# Patient Record
Sex: FEMALE | Race: WHITE | NOT HISPANIC OR LATINO | Employment: STUDENT | URBAN - METROPOLITAN AREA
[De-identification: names, ages, dates, MRNs, and addresses within clinical notes are randomized per-mention and may not be internally consistent; named-entity substitution may affect disease eponyms.]

---
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
## 2024-02-19 ENCOUNTER — OFFICE VISIT (OUTPATIENT)
Dept: PRIMARY CARE | Facility: CLINIC | Age: 19
End: 2024-02-19
Payer: COMMERCIAL

## 2024-02-19 VITALS
WEIGHT: 132 LBS | HEART RATE: 75 BPM | TEMPERATURE: 98.5 F | RESPIRATION RATE: 18 BRPM | HEIGHT: 61 IN | DIASTOLIC BLOOD PRESSURE: 74 MMHG | OXYGEN SATURATION: 98 % | SYSTOLIC BLOOD PRESSURE: 121 MMHG | BODY MASS INDEX: 24.92 KG/M2

## 2024-02-19 DIAGNOSIS — J45.20 MILD INTERMITTENT REACTIVE AIRWAY DISEASE WITHOUT COMPLICATION (HHS-HCC): ICD-10-CM

## 2024-02-19 DIAGNOSIS — F41.9 ANXIETY: Primary | ICD-10-CM

## 2024-02-19 DIAGNOSIS — F45.22 BODY DYSMORPHIC DISORDER: ICD-10-CM

## 2024-02-19 DIAGNOSIS — F33.41 RECURRENT MAJOR DEPRESSIVE DISORDER, IN PARTIAL REMISSION (CMS-HCC): ICD-10-CM

## 2024-02-19 PROBLEM — M79.602 PARESTHESIA AND PAIN OF BOTH UPPER EXTREMITIES: Status: RESOLVED | Noted: 2023-03-24 | Resolved: 2024-02-19

## 2024-02-19 PROBLEM — S39.012A LOW BACK STRAIN: Status: RESOLVED | Noted: 2023-03-21 | Resolved: 2024-02-19

## 2024-02-19 PROBLEM — R20.2 PARESTHESIA AND PAIN OF BOTH UPPER EXTREMITIES: Status: RESOLVED | Noted: 2023-03-24 | Resolved: 2024-02-19

## 2024-02-19 PROBLEM — M79.601 PARESTHESIA AND PAIN OF BOTH UPPER EXTREMITIES: Status: RESOLVED | Noted: 2023-03-24 | Resolved: 2024-02-19

## 2024-02-19 PROBLEM — F32.A ANXIETY AND DEPRESSION: Status: RESOLVED | Noted: 2023-03-21 | Resolved: 2024-02-19

## 2024-02-19 PROBLEM — B07.0 PLANTAR WART OF RIGHT FOOT: Status: RESOLVED | Noted: 2023-03-21 | Resolved: 2024-02-19

## 2024-02-19 PROBLEM — R42 EPISODIC LIGHTHEADEDNESS: Status: RESOLVED | Noted: 2023-03-24 | Resolved: 2024-02-19

## 2024-02-19 PROBLEM — R20.0 NUMBNESS IN BOTH HANDS: Status: RESOLVED | Noted: 2023-03-21 | Resolved: 2024-02-19

## 2024-02-19 PROBLEM — J45.909 REACTIVE AIRWAY DISEASE WITHOUT COMPLICATION (HHS-HCC): Status: ACTIVE | Noted: 2024-02-19

## 2024-02-19 PROCEDURE — 99213 OFFICE O/P EST LOW 20 MIN: CPT

## 2024-02-19 RX ORDER — ALBUTEROL SULFATE 90 UG/1
2 AEROSOL, METERED RESPIRATORY (INHALATION) EVERY 4 HOURS PRN
Qty: 18 G | Refills: 2 | Status: SHIPPED | OUTPATIENT
Start: 2024-02-19 | End: 2024-03-20

## 2024-02-19 RX ORDER — HYDROXYZINE HYDROCHLORIDE 25 MG/1
25 TABLET, FILM COATED ORAL NIGHTLY PRN
Qty: 90 TABLET | Refills: 3 | Status: SHIPPED | OUTPATIENT
Start: 2024-02-19

## 2024-02-19 RX ORDER — SERTRALINE HYDROCHLORIDE 50 MG/1
50 TABLET, FILM COATED ORAL DAILY
Qty: 90 TABLET | Refills: 3 | Status: SHIPPED | OUTPATIENT
Start: 2024-02-19

## 2024-02-19 NOTE — ASSESSMENT & PLAN NOTE
We will restart Zoloft 50 mg daily at patient's request.  He would also like to continue hydroxyzine 25 mg nightly.  Continue seeing talk therapist.  Continue seeing endocrinologist for testosterone.  ED precautions given.

## 2024-02-19 NOTE — ASSESSMENT & PLAN NOTE
Patient occasionally gets short of breath with physical activity.  Denies chest pain, palpitations.  It does resolve quickly.  Heart with regular rate and rhythm without any murmurs, and lungs clear to auscultation bilaterally without any increased work of breathing on exam.  He would like to try an albuterol rescue inhaler.  Will provide patient with this.  Follow-up if not improving symptoms.

## 2024-02-19 NOTE — PROGRESS NOTES
"Troy Paul \"David\" is a 18 y.o. adult who presents for Anxiety (Follow up on anxiety/depression. /Refills needed on zoloft.).  Patient ran out of his Zoloft a month or 2 ago.  He wanted to see how he would feel off of it.  However his anxiety and depression have gotten significantly worse since being off the medication.  Of note he is a transgender male, assigned female at birth.  He has been on testosterone and following with a endocrinologist for a little over a year now.  He is also following with a talk therapist.  In addition to the daily Zoloft, he takes hydroxyzine 25 mg every night to help him sleep.  He stopped smoking cigarettes in November 2023.  He denies any vaping, drug use, alcohol use.  He has a history of anorexia however he has not had any issues with disordered eating recently.  He endorses passive thoughts of self-harm however denies any actual self-harm or any plan.  He is also interested in getting top surgery so his therapist did write him a letter for this.    Objective   /74 (BP Location: Right arm, Patient Position: Sitting)   Pulse 75   Temp 36.9 °C (98.5 °F)   Resp 18   Ht 1.549 m (5' 1\")   Wt 59.9 kg (132 lb)   SpO2 98%   BMI 24.94 kg/m²    PHYSICAL EXAM  Gen: Well appearing, in NAD  Eyes: EOMI  Heart: RRR, no murmurs  Lungs: No increased work of breathing, CTAB, on RA  Extremities: WWP, cap refill <2sec, no pitting edema in LE b/l  Neuro: Alert, symmetrical facies, moves all extremities equally  Psych: Appropriate mood and affect    Assessment/Plan     Problem List Items Addressed This Visit       Body dysmorphic disorder    Anxiety - Primary     We will restart Zoloft 50 mg daily at patient's request.  He would also like to continue hydroxyzine 25 mg nightly.  Continue seeing talk therapist.  Continue seeing endocrinologist for testosterone.  ED precautions given.         Relevant Medications    sertraline (Zoloft) 50 mg tablet    hydrOXYzine HCL (Atarax) " 25 mg tablet    Recurrent major depressive disorder, in partial remission (CMS/[facility])    Reactive airway disease without complication     Patient occasionally gets short of breath with physical activity.  Denies chest pain, palpitations.  It does resolve quickly.  Heart with regular rate and rhythm without any murmurs, and lungs clear to auscultation bilaterally without any increased work of breathing on exam.  He would like to try an albuterol rescue inhaler.  Will provide patient with this.  Follow-up if not improving symptoms.         Relevant Medications    albuterol (ProAir HFA) 90 mcg/actuation inhaler      Lesli Dunn DO  Family Medicine Resident, PGY-3  [facility]  [address] [place] [address] [phone]

## 2024-02-20 NOTE — PROGRESS NOTES
I reviewed the resident/fellow's documentation and discussed the patient with the resident. I agree with the resident medical decision making as documented in the note.   Patient has not been on their SSRI for some time.   does have increased anxiety, no SI or HI thoughts, no harmful thoughts himself or others.   would like to restarted we will do that.  Side effects of medication were explained to the patient by the resident  If Patient starts experiencing any SI or HI thoughts any worsening anxiety or depression any harmful thoughts to self or others patient is to go to the ER  Continue to see therapy  Follow-up in 2 to 3 weeks  Agree with assessment and plan  Anthony Foreman, DO

## 2024-08-29 ENCOUNTER — TELEPHONE (OUTPATIENT)
Dept: PRIMARY CARE | Facility: CLINIC | Age: 19
End: 2024-08-29
Payer: COMMERCIAL

## 2024-08-29 NOTE — TELEPHONE ENCOUNTER
Patient called and requested lab orders for titer testing. Did you want to see patient first before placing orders?

## 2024-09-04 ENCOUNTER — TELEPHONE (OUTPATIENT)
Dept: PRIMARY CARE | Facility: CLINIC | Age: 19
End: 2024-09-04
Payer: COMMERCIAL

## 2024-09-04 NOTE — TELEPHONE ENCOUNTER
Patient called back in regards to lab orders he needs he needs lab  orders to test for     HEP B   Varicella   TDAP  TB     Please place orders I did advise the patient I would call him back once they are placed.

## 2024-09-05 DIAGNOSIS — Z71.85 ENCOUNTER FOR COUNSELING REGARDING IMMUNIZATION: Primary | ICD-10-CM

## 2024-09-10 ENCOUNTER — LAB (OUTPATIENT)
Dept: LAB | Facility: LAB | Age: 19
End: 2024-09-10
Payer: COMMERCIAL

## 2024-09-10 DIAGNOSIS — Z71.85 ENCOUNTER FOR COUNSELING REGARDING IMMUNIZATION: ICD-10-CM

## 2024-09-10 PROCEDURE — 86481 TB AG RESPONSE T-CELL SUSP: CPT

## 2024-09-10 PROCEDURE — 86787 VARICELLA-ZOSTER ANTIBODY: CPT

## 2024-09-10 PROCEDURE — 86706 HEP B SURFACE ANTIBODY: CPT

## 2024-09-10 PROCEDURE — 36415 COLL VENOUS BLD VENIPUNCTURE: CPT

## 2024-09-11 LAB
HBV SURFACE AB SER-ACNC: <3.1 MIU/ML
VARICELLA ZOSTER IGG INDEX: 3.8 IA
VZV IGG SER QL IA: POSITIVE

## 2024-09-12 LAB
NIL(NEG) CONTROL SPOT COUNT: NORMAL
PANEL A SPOT COUNT: 0
PANEL B SPOT COUNT: 1
POS CONTROL SPOT COUNT: NORMAL
T-SPOT. TB INTERPRETATION: NEGATIVE

## 2024-09-16 ENCOUNTER — APPOINTMENT (OUTPATIENT)
Dept: PRIMARY CARE | Facility: CLINIC | Age: 19
End: 2024-09-16
Payer: COMMERCIAL

## 2024-09-18 ENCOUNTER — TELEPHONE (OUTPATIENT)
Dept: OPHTHALMOLOGY | Facility: CLINIC | Age: 19
End: 2024-09-18

## 2024-09-24 ENCOUNTER — TELEPHONE (OUTPATIENT)
Dept: PRIMARY CARE | Facility: CLINIC | Age: 19
End: 2024-09-24
Payer: COMMERCIAL

## 2024-09-24 DIAGNOSIS — Z71.85 ENCOUNTER FOR COUNSELING REGARDING IMMUNIZATION: Primary | ICD-10-CM

## 2024-10-03 ENCOUNTER — OFFICE VISIT (OUTPATIENT)
Dept: PRIMARY CARE | Facility: CLINIC | Age: 19
End: 2024-10-03
Payer: COMMERCIAL

## 2024-10-03 VITALS
OXYGEN SATURATION: 98 % | HEIGHT: 61 IN | TEMPERATURE: 96 F | RESPIRATION RATE: 19 BRPM | HEART RATE: 86 BPM | DIASTOLIC BLOOD PRESSURE: 64 MMHG | SYSTOLIC BLOOD PRESSURE: 120 MMHG | WEIGHT: 135.4 LBS | BODY MASS INDEX: 25.57 KG/M2

## 2024-10-03 DIAGNOSIS — Z86.59 HISTORY OF BULIMIA: ICD-10-CM

## 2024-10-03 DIAGNOSIS — F60.3 BORDERLINE PERSONALITY DISORDER (MULTI): ICD-10-CM

## 2024-10-03 DIAGNOSIS — H52.13 BILATERAL MYOPIA: ICD-10-CM

## 2024-10-03 DIAGNOSIS — H52.223 REGULAR ASTIGMATISM OF BOTH EYES: ICD-10-CM

## 2024-10-03 DIAGNOSIS — F41.9 ANXIETY: ICD-10-CM

## 2024-10-03 DIAGNOSIS — Z86.59 HISTORY OF ANOREXIA NERVOSA: ICD-10-CM

## 2024-10-03 DIAGNOSIS — F45.22 BODY DYSMORPHIC DISORDER: ICD-10-CM

## 2024-10-03 DIAGNOSIS — Z91.89 HISTORY OF DRUG OVERDOSE: ICD-10-CM

## 2024-10-03 DIAGNOSIS — Z00.00 ANNUAL PHYSICAL EXAM: Primary | ICD-10-CM

## 2024-10-03 DIAGNOSIS — Z78.9 FEMALE-TO-MALE TRANSGENDER PERSON: ICD-10-CM

## 2024-10-03 DIAGNOSIS — F33.41 RECURRENT MAJOR DEPRESSIVE DISORDER, IN PARTIAL REMISSION (CMS-HCC): ICD-10-CM

## 2024-10-03 PROBLEM — J45.909 REACTIVE AIRWAY DISEASE WITHOUT COMPLICATION (HHS-HCC): Status: RESOLVED | Noted: 2024-02-19 | Resolved: 2024-10-03

## 2024-10-03 PROBLEM — T50.902A INTENTIONAL OVERDOSE OF DRUG IN TABLET FORM (MULTI): Status: ACTIVE | Noted: 2024-10-03

## 2024-10-03 PROCEDURE — 99395 PREV VISIT EST AGE 18-39: CPT

## 2024-10-03 PROCEDURE — 3008F BODY MASS INDEX DOCD: CPT

## 2024-10-03 PROCEDURE — 1036F TOBACCO NON-USER: CPT

## 2024-10-03 NOTE — PROGRESS NOTES
"Troy Paul \"David\" is a 18 y.o. adult who is here for Annual Exam (Form for school).    ANNUAL PHYSICAL EXAM    Concerns:  - Patient was diagnosed with borderline personality disorder, anxiety, depression, body dysmorphia, has a history of anorexia and bulimia but is currently in remission from these, and happens to be a male transgender person (assigned female at birth).  He feels that his anxiety has been worse recently.  He denies SI, or any recent self-harm.  He is still following with psychiatrist every 6 to 12 months.  He is currently on Zoloft 50 mg daily, and hydroxyzine 25 mg nightly.  He feels like it is helping some but definitely could use more improvement.  He used to follow with a talk therapist however he is currently not following with them anymore as he did not feel it was the best fit.  He would like to find a talk therapist who specializes more in borderline personality disorder.  Of note, he does have a boyfriend now.  He feels that this is a very supportive partner for him.  He has been on testosterone injections every 2 weeks since 1/30/2023.  He follows with endocrinology for this and that is going well.  Next month he has a consultation for top surgery (bilateral mastectomy).  He is not interested in having bottom surgery done.    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 Graduates in August 2025. Gonna be a  paramedic for [employer] or [employer].   - Diet: Well balanced  - Exercise: Regularly  - Alcohol/tobacco/drugs: Denies x3   - Contraception: Sexually active with men, his partner is using male condoms  - Mood: " "See above   - Menstrual periods: Not having menstrual cycles because of testosterone therapy    Active Problem List      Comprehensive Medical/Surgical/Social/Family History  Past Medical History:   Diagnosis Date    Anxiety     Depression     Other conditions influencing health status     No significant past medical history     Past Surgical History:   Procedure Laterality Date    OTHER SURGICAL HISTORY  08/16/2018    Dilation And Curettage For Hydatidiform Mole     Social History     Social History Narrative    Not on file       Allergies and Medications  Patient has no known allergies.  Current Outpatient Medications on File Prior to Visit   Medication Sig Dispense Refill    hydrOXYzine HCL (Atarax) 25 mg tablet Take 1 tablet (25 mg) by mouth as needed at bedtime for anxiety. 90 tablet 3    sertraline (Zoloft) 50 mg tablet Take 1 tablet (50 mg) by mouth once daily. 90 tablet 3    testosterone cypionate (Depo-Testosterone) 200 mg/mL injection Inject 0.1 mL (20 mg) into the muscle every 14 (fourteen) days.      [DISCONTINUED] albuterol (ProAir HFA) 90 mcg/actuation inhaler Inhale 2 puffs every 4 hours if needed for wheezing or shortness of breath. (Patient not taking: Reported on 10/3/2024) 18 g 2     No current facility-administered medications on file prior to visit.       Objective   /64   Pulse 86   Temp 35.6 °C (96 °F)   Resp 19   Ht 1.549 m (5' 1\")   Wt 61.4 kg (135 lb 6.4 oz)   SpO2 98%   BMI 25.58 kg/m²    PHYSICAL EXAM  Gen: Well appearing, in NAD  Eyes: EOMI  HEENT: MMM. TMs normal. Throat normal.  Heart: RRR, no murmurs  Lungs: No increased work of breathing, CTAB, on RA  GI: Soft, NTND, no guarding or rebound  Extremities: WWP, cap refill <2sec, no pitting edema in LE b/l  Neuro: Alert, symmetrical facies, moves all extremities equally  Psych: Appropriate mood and affect    Assessment/Plan   - Reviewed Social Determinants of health with patient. Discussed healthy lifestyle, including 150 " minutes of physical activity per week.  - Ordered/Reviewed baseline labwork   - Immunizations up to date  - Follow up in 1 year for next annual physical or sooner for acute concerns    Problem List Items Addressed This Visit       History of anorexia nervosa    Overview     Remission         Relevant Orders    Follow Up In [facility]    Astigmatism, bilateral    Overview     Onset Date: 8/16/2018         Bilateral myopia    Overview     Onset Date: 8/16/2018         Body dysmorphic disorder    Relevant Orders    Follow Up In [facility]    Anxiety    Current Assessment & Plan     Follows with psychiatry.  On sertraline, hydroxyzine.  It is taking a while to get back in with a psychiatrist and he does feel that he could use some medication adjustments.  He also is not currently following with a talk therapist but is interested in finding one that specializes more in borderline personality disorder.  Today I gave him a referral for our behavioral health coordinator to get him set back up into counseling and to hopefully find more resources specifically for borderline personality disorder, as well as to possibly adjust his medications.         Relevant Orders    Follow Up In [facility]    Recurrent major depressive disorder, in partial remission (CMS-HCC)    Relevant Orders    Follow Up In [facility]    History of drug overdose    Borderline personality disorder (Multi)    Relevant Orders    Follow Up In [facility]    History of bulimia    Overview     Remission         Relevant Orders    Follow Up In [facility]    Female-to-male transgender person    Overview     Transgender male (assigned  female at birth). He has been on testosterone injections every 2 weeks since 1/30/2023 for gender affirming therapy.  He follows with endocrinology for this and that is going well.          Current Assessment & Plan     Next month he has a consultation for top surgery (bilateral mastectomy).  He is not interested in having bottom surgery done.          Other Visit Diagnoses       Annual physical exam    -  Primary          Lesli Dunn D.O.  Family Medicine Physician  [facility]  [address] [place] [address] [phone]    This note has been transcribed using Dragon voice recognition system and there is a possibility of unintentional typing misprints.

## 2024-10-03 NOTE — ASSESSMENT & PLAN NOTE
Next month he has a consultation for top surgery (bilateral mastectomy).  He is not interested in having bottom surgery done.

## 2024-10-03 NOTE — ASSESSMENT & PLAN NOTE
Follows with psychiatry.  On sertraline, hydroxyzine.  It is taking a while to get back in with a psychiatrist and he does feel that he could use some medication adjustments.  He also is not currently following with a talk therapist but is interested in finding one that specializes more in borderline personality disorder.  Today I gave him a referral for our behavioral health coordinator to get him set back up into counseling and to hopefully find more resources specifically for borderline personality disorder, as well as to possibly adjust his medications.

## 2024-10-04 ENCOUNTER — TELEPHONE (OUTPATIENT)
Dept: PRIMARY CARE | Facility: CLINIC | Age: 19
End: 2024-10-04
Payer: COMMERCIAL

## 2024-10-04 NOTE — PROGRESS NOTES
This writer reached out to patient regarding collaborative care referral. This writer left patient a voicemail. This writer will attempt at another date and time.

## 2024-10-07 ENCOUNTER — LAB (OUTPATIENT)
Dept: LAB | Facility: LAB | Age: 19
End: 2024-10-07
Payer: COMMERCIAL

## 2024-10-07 DIAGNOSIS — Z71.85 ENCOUNTER FOR COUNSELING REGARDING IMMUNIZATION: ICD-10-CM

## 2024-10-07 LAB
MEV IGG SER QL IA: POSITIVE
MUMPS IGG ANTIBODY INDEX: 3.4 IA
MUV IGG SER IA-ACNC: POSITIVE
RUBEOLA IGG ANTIBODY INDEX: 4.4 IA
RUBV IGG SERPL IA-ACNC: 2.3 IA
RUBV IGG SERPL QL IA: POSITIVE

## 2024-10-07 PROCEDURE — 36415 COLL VENOUS BLD VENIPUNCTURE: CPT

## 2024-10-07 PROCEDURE — 86735 MUMPS ANTIBODY: CPT

## 2024-10-07 PROCEDURE — 86765 RUBEOLA ANTIBODY: CPT

## 2024-10-07 PROCEDURE — 86317 IMMUNOASSAY INFECTIOUS AGENT: CPT

## 2024-10-08 ENCOUNTER — TELEPHONE (OUTPATIENT)
Dept: PRIMARY CARE | Facility: CLINIC | Age: 19
End: 2024-10-08
Payer: COMMERCIAL

## 2024-10-08 NOTE — PROGRESS NOTES
This writer spoke with patient regarding collaborative care referral and patient is scheduled for an assessment on 10/24/2024 at 10:00 AM in [facility].

## 2024-10-22 ENCOUNTER — TELEPHONE (OUTPATIENT)
Dept: PRIMARY CARE | Facility: CLINIC | Age: 19
End: 2024-10-22
Payer: COMMERCIAL

## 2024-10-22 NOTE — PROGRESS NOTES
This writer reached out to patient regarding cancelled appointment on 10/24/2024. This writer spoke with patient and patient was unsure when they wanted to reschedule appointment for initial assessment for collaborative care. This writer sent patient a message via [employer]. Patient reported they would let this writer know when they would like to reschedule with their availability.

## 2024-10-24 ENCOUNTER — APPOINTMENT (OUTPATIENT)
Dept: PRIMARY CARE | Facility: CLINIC | Age: 19
End: 2024-10-24
Payer: COMMERCIAL

## 2024-11-14 ENCOUNTER — SOCIAL WORK (OUTPATIENT)
Dept: PRIMARY CARE | Facility: CLINIC | Age: 19
End: 2024-11-14

## 2024-11-14 ENCOUNTER — APPOINTMENT (OUTPATIENT)
Dept: PRIMARY CARE | Facility: CLINIC | Age: 19
End: 2024-11-14
Payer: COMMERCIAL

## 2024-11-14 ASSESSMENT — PATIENT HEALTH QUESTIONNAIRE - PHQ9
3. TROUBLE FALLING OR STAYING ASLEEP: NOT AT ALL
4. FEELING TIRED OR HAVING LITTLE ENERGY: SEVERAL DAYS
6. FEELING BAD ABOUT YOURSELF - OR THAT YOU ARE A FAILURE OR HAVE LET YOURSELF OR YOUR FAMILY DOWN: SEVERAL DAYS
SUM OF ALL RESPONSES TO PHQ QUESTIONS 1-9: 7
1. LITTLE INTEREST OR PLEASURE IN DOING THINGS: SEVERAL DAYS
8. MOVING OR SPEAKING SO SLOWLY THAT OTHER PEOPLE COULD HAVE NOTICED. OR THE OPPOSITE, BEING SO FIGETY OR RESTLESS THAT YOU HAVE BEEN MOVING AROUND A LOT MORE THAN USUAL: SEVERAL DAYS
9. THOUGHTS THAT YOU WOULD BE BETTER OFF DEAD, OR OF HURTING YOURSELF: SEVERAL DAYS
10. IF YOU CHECKED OFF ANY PROBLEMS, HOW DIFFICULT HAVE THESE PROBLEMS MADE IT FOR YOU TO DO YOUR WORK, TAKE CARE OF THINGS AT HOME, OR GET ALONG WITH OTHER PEOPLE: SOMEWHAT DIFFICULT
2. FEELING DOWN, DEPRESSED OR HOPELESS: SEVERAL DAYS
5. POOR APPETITE OR OVEREATING: NOT AT ALL
SUM OF ALL RESPONSES TO PHQ9 QUESTIONS 1 & 2: 2
7. TROUBLE CONCENTRATING ON THINGS, SUCH AS READING THE NEWSPAPER OR WATCHING TELEVISION: SEVERAL DAYS

## 2024-11-14 ASSESSMENT — ANXIETY QUESTIONNAIRES
6. BECOMING EASILY ANNOYED OR IRRITABLE: SEVERAL DAYS
5. BEING SO RESTLESS THAT IT IS HARD TO SIT STILL: SEVERAL DAYS
GAD7 TOTAL SCORE: 7
4. TROUBLE RELAXING: SEVERAL DAYS
IF YOU CHECKED OFF ANY PROBLEMS ON THIS QUESTIONNAIRE, HOW DIFFICULT HAVE THESE PROBLEMS MADE IT FOR YOU TO DO YOUR WORK, TAKE CARE OF THINGS AT HOME, OR GET ALONG WITH OTHER PEOPLE: VERY DIFFICULT
3. WORRYING TOO MUCH ABOUT DIFFERENT THINGS: SEVERAL DAYS
1. FEELING NERVOUS, ANXIOUS, OR ON EDGE: SEVERAL DAYS
2. NOT BEING ABLE TO STOP OR CONTROL WORRYING: SEVERAL DAYS
7. FEELING AFRAID AS IF SOMETHING AWFUL MIGHT HAPPEN: SEVERAL DAYS
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
## 2024-11-15 ENCOUNTER — DOCUMENTATION (OUTPATIENT)
Dept: BEHAVIORAL HEALTH | Facility: CLINIC | Age: 19
End: 2024-11-15
Payer: COMMERCIAL

## 2024-11-15 DIAGNOSIS — F41.9 ANXIETY: ICD-10-CM

## 2024-11-15 RX ORDER — SERTRALINE HYDROCHLORIDE 50 MG/1
75 TABLET, FILM COATED ORAL DAILY
Qty: 90 TABLET | Refills: 3 | Status: SHIPPED | OUTPATIENT
Start: 2024-11-15

## 2024-11-15 RX ORDER — HYDROXYZINE HYDROCHLORIDE 10 MG/1
TABLET, FILM COATED ORAL
Qty: 450 TABLET | Refills: 3 | Status: SHIPPED | OUTPATIENT
Start: 2024-11-15
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
## 2024-11-26 ENCOUNTER — DOCUMENTATION (OUTPATIENT)
Dept: PRIMARY CARE | Facility: CLINIC | Age: 19
End: 2024-11-26
Payer: COMMERCIAL

## 2024-11-26 DIAGNOSIS — F41.9 ANXIETY: Primary | ICD-10-CM

## 2024-12-05 ENCOUNTER — APPOINTMENT (OUTPATIENT)
Dept: PRIMARY CARE | Facility: CLINIC | Age: 19
End: 2024-12-05
Payer: COMMERCIAL

## 2024-12-09 ENCOUNTER — APPOINTMENT (OUTPATIENT)
Dept: OPHTHALMOLOGY | Facility: CLINIC | Age: 19
End: 2024-12-09
Payer: COMMERCIAL

## 2024-12-30 ENCOUNTER — DOCUMENTATION (OUTPATIENT)
Dept: PRIMARY CARE | Facility: CLINIC | Age: 19
End: 2024-12-30
Payer: COMMERCIAL

## 2024-12-30 NOTE — PROGRESS NOTES
This writer is discharging patient from collaborative care due to not being able to get a hold of patient. This writer reached out to patient on 12/5/24 and 12/16/24 and patient has not responded to message in chart.